# Patient Record
Sex: FEMALE | Race: BLACK OR AFRICAN AMERICAN | NOT HISPANIC OR LATINO | URBAN - METROPOLITAN AREA
[De-identification: names, ages, dates, MRNs, and addresses within clinical notes are randomized per-mention and may not be internally consistent; named-entity substitution may affect disease eponyms.]

---

## 2024-05-09 ENCOUNTER — OFFICE VISIT (OUTPATIENT)
Dept: URBAN - METROPOLITAN AREA CLINIC 92 | Facility: CLINIC | Age: 34
End: 2024-05-09

## 2024-05-29 ENCOUNTER — CLAIMS CREATED FROM THE CLAIM WINDOW (OUTPATIENT)
Dept: URBAN - METROPOLITAN AREA CLINIC 92 | Facility: CLINIC | Age: 34
End: 2024-05-29

## 2024-05-29 ENCOUNTER — DASHBOARD ENCOUNTERS (OUTPATIENT)
Age: 34
End: 2024-05-29

## 2024-05-29 ENCOUNTER — OFFICE VISIT (OUTPATIENT)
Dept: URBAN - METROPOLITAN AREA CLINIC 92 | Facility: CLINIC | Age: 34
End: 2024-05-29
Payer: COMMERCIAL

## 2024-05-29 VITALS
SYSTOLIC BLOOD PRESSURE: 112 MMHG | HEIGHT: 65 IN | WEIGHT: 124 LBS | TEMPERATURE: 97 F | HEART RATE: 94 BPM | DIASTOLIC BLOOD PRESSURE: 67 MMHG | BODY MASS INDEX: 20.66 KG/M2

## 2024-05-29 DIAGNOSIS — R10.31 RLQ ABDOMINAL PAIN: ICD-10-CM

## 2024-05-29 PROCEDURE — 99243 OFF/OP CNSLTJ NEW/EST LOW 30: CPT

## 2024-05-29 PROCEDURE — 99203 OFFICE O/P NEW LOW 30 MIN: CPT

## 2024-05-29 RX ORDER — ONDANSETRON HYDROCHLORIDE 4 MG/1
2 TABLET TABLET, FILM COATED ORAL
Qty: 2 | Refills: 0 | OUTPATIENT
Start: 2024-05-29

## 2024-05-29 RX ORDER — NORETHINDRONE ACETATE AND ETHINYL ESTRADIOL AND FERROUS FUMARATE 1MG-20(21)
1 TABLET KIT ORAL ONCE A DAY
Status: DISCONTINUED | COMMUNITY

## 2024-05-29 RX ORDER — POLYETHYLENE GLYCOL-3350 AND ELECTROLYTES WITH FLAVOR PACK 240; 5.84; 2.98; 6.72; 22.72 G/278.26G; G/278.26G; G/278.26G; G/278.26G; G/278.26G
4000ML POWDER, FOR SOLUTION ORAL
Qty: 4000 MILLILITER | Refills: 0 | OUTPATIENT
Start: 2024-05-29 | End: 2024-05-30

## 2024-06-18 ENCOUNTER — OFFICE VISIT (OUTPATIENT)
Dept: URBAN - METROPOLITAN AREA CLINIC 92 | Facility: CLINIC | Age: 34
End: 2024-06-18

## 2024-06-26 ENCOUNTER — TELEPHONE ENCOUNTER (OUTPATIENT)
Dept: URBAN - METROPOLITAN AREA CLINIC 92 | Facility: CLINIC | Age: 34
End: 2024-06-26

## 2024-07-02 ENCOUNTER — OFFICE VISIT (OUTPATIENT)
Dept: URBAN - METROPOLITAN AREA CLINIC 92 | Facility: CLINIC | Age: 34
End: 2024-07-02

## 2024-08-13 ENCOUNTER — OFFICE VISIT (OUTPATIENT)
Dept: URBAN - METROPOLITAN AREA SURGERY CENTER 16 | Facility: SURGERY CENTER | Age: 34
End: 2024-08-13

## 2024-08-27 ENCOUNTER — OFFICE VISIT (OUTPATIENT)
Dept: URBAN - METROPOLITAN AREA TELEHEALTH 2 | Facility: TELEHEALTH | Age: 34
End: 2024-08-27

## 2024-10-10 ENCOUNTER — TELEPHONE ENCOUNTER (OUTPATIENT)
Dept: URBAN - METROPOLITAN AREA CLINIC 92 | Facility: CLINIC | Age: 34
End: 2024-10-10

## 2024-10-24 ENCOUNTER — OFFICE VISIT (OUTPATIENT)
Dept: URBAN - METROPOLITAN AREA TELEHEALTH 2 | Facility: TELEHEALTH | Age: 34
End: 2024-10-24

## 2024-10-24 VITALS — WEIGHT: 134 LBS | HEIGHT: 65 IN | BODY MASS INDEX: 22.33 KG/M2

## 2024-10-24 RX ORDER — ONDANSETRON HYDROCHLORIDE 4 MG/1
2 TABLET TABLET, FILM COATED ORAL
Qty: 2 | Refills: 0 | Status: ON HOLD | COMMUNITY
Start: 2024-05-29

## 2024-10-24 RX ORDER — ONDANSETRON HYDROCHLORIDE 4 MG/1
2 TABLET TABLET, FILM COATED ORAL
Qty: 2 | Refills: 0 | Status: ACTIVE | COMMUNITY
Start: 2024-05-29

## 2024-10-24 NOTE — HPI-TODAY'S VISIT:
This patient was referred by gyn NP Lakisha Orellana for an evaluation of lower abdominal/Pelvic pain.  A copy of this will be sent to the referring provider.  U/S revealed 1.5 cm ovarian cyst stable since last imaging. She was referred to have an MRI.   Patient notes of occasional right lower abdominal pain for over 10 years. Pain precipitated by tampon use and during intercourse graded a 10/10. No current abdominal pain. Admits to mild constipation which she attributes to her poor diet. Stool can be pellet like. Drinks plenty of water. Patient typically has a daily BM. Denies hematochezia or melena. Denies reflux, nausea, vomiting, dysphagia, or changes in appetite. Patient notes of 10 lb weight gain since December 2023, now working from home. Denies frequent NSAID use. Denies tobacco, alcohol, or illicit drug use. Never had a colonoscopy. Maternal uncle with hx of colon cancer (50s). Denies fmhx of IBD.  Patient denies any cardiac, lung, or kidney issues. No pacemaker/defibrillator, No blood thinner, No home O2. No dialysis.  Today, patient notes she was not able to have colonoscopy but she is 5 months pregnant. Patient notes of current constipation. Admits to black stool beginning a week ago, last episodes 5 days ago. Admits to mild lightheadedness when overheated ongoing for years before black stools. Denies chest pain or shortness of breath. Taking a prenatal pill. Taking fiber gummies. Since being pregnant her constipation has worsened. Patient has a BM every 2-3 days.Denies hematochezia. Drinking a smoothies with cucumber, celery, apple, gingers and spinach, now having 2-3 times daily but having incomplete BM. Admits to bone pain and abdominal soreness, this started 2 weeks ago. Admits to nausea cuased by pregnancy. Denies vomiting or Not taking MiraLax.MRI was never done. Sees her OBGYN next Friday.

## 2024-10-24 NOTE — PHYSICAL EXAM PSYCH:
Detail Level: Zone normal mood with appropriate affect Detail Level: Generalized Detail Level: Detailed

## 2025-05-20 ENCOUNTER — TELEPHONE ENCOUNTER (OUTPATIENT)
Dept: URBAN - METROPOLITAN AREA CLINIC 92 | Facility: CLINIC | Age: 35
End: 2025-05-20

## 2025-05-23 ENCOUNTER — CLAIMS CREATED FROM THE CLAIM WINDOW (OUTPATIENT)
Dept: URBAN - METROPOLITAN AREA SURGERY CENTER 16 | Facility: SURGERY CENTER | Age: 35
End: 2025-05-23
Payer: COMMERCIAL

## 2025-05-23 DIAGNOSIS — Z80.0 FAMILY HISTORY OF COLON CANCER: ICD-10-CM

## 2025-05-23 DIAGNOSIS — R10.31 ABDOMINAL CRAMPING IN RIGHT LOWER QUADRANT: ICD-10-CM

## 2025-05-23 DIAGNOSIS — R10.32 ABDOMINAL CRAMPING IN LEFT LOWER QUADRANT: ICD-10-CM

## 2025-05-23 DIAGNOSIS — Z12.11 COLON CANCER SCREENING: ICD-10-CM

## 2025-05-23 PROCEDURE — 00812 ANES LWR INTST SCR COLSC: CPT | Performed by: ANESTHESIOLOGY

## 2025-05-23 PROCEDURE — 45378 DIAGNOSTIC COLONOSCOPY: CPT | Performed by: INTERNAL MEDICINE

## 2025-05-23 PROCEDURE — 00812 ANES LWR INTST SCR COLSC: CPT | Performed by: ANESTHESIOLOGIST ASSISTANT

## 2025-05-23 RX ORDER — ONDANSETRON HYDROCHLORIDE 4 MG/1
2 TABLET TABLET, FILM COATED ORAL
Qty: 2 | Refills: 0 | Status: ACTIVE | COMMUNITY
Start: 2024-05-29